# Patient Record
Sex: MALE | URBAN - METROPOLITAN AREA
[De-identification: names, ages, dates, MRNs, and addresses within clinical notes are randomized per-mention and may not be internally consistent; named-entity substitution may affect disease eponyms.]

---

## 2021-12-22 ENCOUNTER — OFFICE VISIT (OUTPATIENT)
Dept: URBAN - METROPOLITAN AREA CLINIC 7 | Facility: CLINIC | Age: 56
End: 2021-12-22
Payer: OTHER GOVERNMENT

## 2021-12-22 DIAGNOSIS — E11.3493 TYPE 2 DIAB W SEVERE NONPRLF DIAB RTNOP W/O MACULAR EDEMA, BILATERAL: Primary | ICD-10-CM

## 2021-12-22 DIAGNOSIS — H25.13 AGE-RELATED NUCLEAR CATARACT, BILATERAL: ICD-10-CM

## 2021-12-22 PROCEDURE — 99204 OFFICE O/P NEW MOD 45 MIN: CPT | Performed by: OPHTHALMOLOGY

## 2021-12-22 PROCEDURE — 92235 FLUORESCEIN ANGRPH MLTIFRAME: CPT | Performed by: OPHTHALMOLOGY

## 2021-12-22 PROCEDURE — 92134 CPTRZ OPH DX IMG PST SGM RTA: CPT | Performed by: OPHTHALMOLOGY

## 2021-12-22 ASSESSMENT — INTRAOCULAR PRESSURE
OD: 14
OS: 13

## 2021-12-22 NOTE — IMPRESSION/PLAN
Impression: Type 2 diab w severe nonprlf diab rtnop w/o macular edema, bilateral: B73.6597. Bilateral.
-treatment naive OCT:
OD: extrafoveal IRF
OS: wnl FA:
OD: severe NPDR mild macular leakage OS: severe NPDR mild macular leakage Plan: Retinal examination reveals non-proliferative diabetic retinopathy. The diagnosis, natural history, and prognosis of NPDR were discussed at length. The importance of blood sugar, blood pressure, and cholesterol control and their relationship to progression of diabetic retinopathy were reviewed. 

RTC 3-4 months DFE OU OCT OU Re-aissatou (Refer to Quin)

## 2022-04-20 ENCOUNTER — OFFICE VISIT (OUTPATIENT)
Dept: URBAN - METROPOLITAN AREA CLINIC 7 | Facility: CLINIC | Age: 57
End: 2022-04-20
Payer: OTHER GOVERNMENT

## 2022-04-20 DIAGNOSIS — E11.3493 TYPE 2 DIAB W SEVERE NONPRLF DIAB RTNOP W/O MACULAR EDEMA, BILATERAL: Primary | ICD-10-CM

## 2022-04-20 DIAGNOSIS — H40.9 GLAUCOMA: ICD-10-CM

## 2022-04-20 DIAGNOSIS — H25.13 AGE-RELATED NUCLEAR CATARACT, BILATERAL: ICD-10-CM

## 2022-04-20 PROCEDURE — 99214 OFFICE O/P EST MOD 30 MIN: CPT | Performed by: OPHTHALMOLOGY

## 2022-04-20 PROCEDURE — 92134 CPTRZ OPH DX IMG PST SGM RTA: CPT | Performed by: OPHTHALMOLOGY

## 2022-04-20 ASSESSMENT — INTRAOCULAR PRESSURE
OD: 11
OS: 11

## 2022-04-20 NOTE — IMPRESSION/PLAN
Impression: Type 2 diab w severe nonprlf diab rtnop w/o macular edema, bilateral: N43.5691. Bilateral.
-treatment naive OCT: 04/20/22 OD: extrafoveal IRF
OS: wnl FA:
OD: severe NPDR mild macular leakage OS: severe NPDR mild macular leakage Plan: Retinal examination reveals non-proliferative diabetic retinopathy. The diagnosis, natural history, and prognosis of NPDR were discussed at length. The importance of blood sugar, blood pressure, and cholesterol control and their relationship to progression of diabetic retinopathy were reviewed. 

RTC 3-4 months DFE OU OCT OU FA Transit OD Possible focal laser OD

## 2022-04-20 NOTE — IMPRESSION/PLAN
Impression: Glaucoma: H40.9. Plan: suspect by c/d. Not on drops. IOP in good range. Pt being monitored by Dr Marietta Eisenmenger.